# Patient Record
Sex: MALE | Race: WHITE | Employment: OTHER | URBAN - METROPOLITAN AREA
[De-identification: names, ages, dates, MRNs, and addresses within clinical notes are randomized per-mention and may not be internally consistent; named-entity substitution may affect disease eponyms.]

---

## 2024-09-04 ENCOUNTER — APPOINTMENT (OUTPATIENT)
Dept: CT IMAGING | Age: 44
End: 2024-09-04

## 2024-09-04 ENCOUNTER — HOSPITAL ENCOUNTER (INPATIENT)
Age: 44
LOS: 2 days | Discharge: HOME | End: 2024-09-06
Admitting: INTERNAL MEDICINE

## 2024-09-04 DIAGNOSIS — K57.20 DIVERTICULITIS OF COLON WITH PERFORATION: Primary | ICD-10-CM

## 2024-09-04 LAB
ALBUMIN SERPL-MCNC: 4.1 G/DL (ref 3.5–5.2)
ALP SERPL-CCNC: 64 U/L (ref 40–129)
ALT SERPL-CCNC: 12 U/L (ref 5–41)
AMORPH SED URNS QL MICRO: ABNORMAL
ANION GAP SERPL CALCULATED.3IONS-SCNC: 9 MMOL/L (ref 9–17)
AST SERPL-CCNC: 19 U/L
BASOPHILS # BLD: 0.04 K/UL (ref 0–0.2)
BASOPHILS NFR BLD: 0 % (ref 0–2)
BILIRUB SERPL-MCNC: 1 MG/DL (ref 0.3–1.2)
BILIRUB UR QL STRIP: ABNORMAL
BUN SERPL-MCNC: 16 MG/DL (ref 6–20)
BUN/CREAT SERPL: 20 (ref 9–20)
CALCIUM SERPL-MCNC: 9.2 MG/DL (ref 8.6–10.4)
CHLORIDE SERPL-SCNC: 100 MMOL/L (ref 98–107)
CLARITY UR: ABNORMAL
CO2 SERPL-SCNC: 26 MMOL/L (ref 20–31)
COLOR UR: YELLOW
CREAT SERPL-MCNC: 0.8 MG/DL (ref 0.7–1.2)
EOSINOPHIL # BLD: <0.03 K/UL (ref 0–0.44)
EOSINOPHILS RELATIVE PERCENT: 0 % (ref 1–4)
EPI CELLS #/AREA URNS HPF: ABNORMAL /HPF (ref 0–5)
ERYTHROCYTE [DISTWIDTH] IN BLOOD BY AUTOMATED COUNT: 12.2 % (ref 11.8–14.4)
GFR, ESTIMATED: >90 ML/MIN/1.73M2
GLUCOSE SERPL-MCNC: 121 MG/DL (ref 70–99)
GLUCOSE UR STRIP-MCNC: NEGATIVE MG/DL
HCT VFR BLD AUTO: 47.3 % (ref 40.7–50.3)
HGB BLD-MCNC: 15.9 G/DL (ref 13–17)
HGB UR QL STRIP.AUTO: ABNORMAL
IMM GRANULOCYTES # BLD AUTO: 0.06 K/UL (ref 0–0.3)
IMM GRANULOCYTES NFR BLD: 0 %
KETONES UR STRIP-MCNC: ABNORMAL MG/DL
LEUKOCYTE ESTERASE UR QL STRIP: NEGATIVE
LIPASE SERPL-CCNC: 18 U/L (ref 13–60)
LYMPHOCYTES NFR BLD: 1.1 K/UL (ref 1.1–3.7)
LYMPHOCYTES RELATIVE PERCENT: 8 % (ref 24–43)
MCH RBC QN AUTO: 30.1 PG (ref 25.2–33.5)
MCHC RBC AUTO-ENTMCNC: 33.6 G/DL (ref 28.4–34.8)
MCV RBC AUTO: 89.4 FL (ref 82.6–102.9)
MONOCYTES NFR BLD: 0.93 K/UL (ref 0.1–1.2)
MONOCYTES NFR BLD: 7 % (ref 3–12)
MUCOUS THREADS URNS QL MICRO: ABNORMAL
NEUTROPHILS NFR BLD: 85 % (ref 36–65)
NEUTS SEG NFR BLD: 11.2 K/UL (ref 1.5–8.1)
NITRITE UR QL STRIP: NEGATIVE
NRBC BLD-RTO: 0 PER 100 WBC
PH UR STRIP: 6.5 [PH] (ref 5–8)
PLATELET # BLD AUTO: 228 K/UL (ref 138–453)
PMV BLD AUTO: 10.1 FL (ref 8.1–13.5)
POTASSIUM SERPL-SCNC: 3.8 MMOL/L (ref 3.7–5.3)
PROT SERPL-MCNC: 7.7 G/DL (ref 6.4–8.3)
PROT UR STRIP-MCNC: ABNORMAL MG/DL
RBC # BLD AUTO: 5.29 M/UL (ref 4.21–5.77)
RBC #/AREA URNS HPF: ABNORMAL /HPF (ref 0–2)
SODIUM SERPL-SCNC: 135 MMOL/L (ref 135–144)
SP GR UR STRIP: 1.02 (ref 1–1.03)
UROBILINOGEN UR STRIP-ACNC: NORMAL EU/DL (ref 0–1)
WBC #/AREA URNS HPF: ABNORMAL /HPF (ref 0–5)
WBC OTHER # BLD: 13.4 K/UL (ref 3.5–11.3)

## 2024-09-04 PROCEDURE — 99221 1ST HOSP IP/OBS SF/LOW 40: CPT | Performed by: INTERNAL MEDICINE

## 2024-09-04 PROCEDURE — 6360000002 HC RX W HCPCS

## 2024-09-04 PROCEDURE — 81001 URINALYSIS AUTO W/SCOPE: CPT

## 2024-09-04 PROCEDURE — 1200000000 HC SEMI PRIVATE

## 2024-09-04 PROCEDURE — 2580000003 HC RX 258

## 2024-09-04 PROCEDURE — 6360000002 HC RX W HCPCS: Performed by: NURSE PRACTITIONER

## 2024-09-04 PROCEDURE — 80053 COMPREHEN METABOLIC PANEL: CPT

## 2024-09-04 PROCEDURE — 6360000004 HC RX CONTRAST MEDICATION

## 2024-09-04 PROCEDURE — 74177 CT ABD & PELVIS W/CONTRAST: CPT

## 2024-09-04 PROCEDURE — 99285 EMERGENCY DEPT VISIT HI MDM: CPT

## 2024-09-04 PROCEDURE — 85025 COMPLETE CBC W/AUTO DIFF WBC: CPT

## 2024-09-04 PROCEDURE — 83690 ASSAY OF LIPASE: CPT

## 2024-09-04 PROCEDURE — 96374 THER/PROPH/DIAG INJ IV PUSH: CPT

## 2024-09-04 PROCEDURE — 2580000003 HC RX 258: Performed by: NURSE PRACTITIONER

## 2024-09-04 RX ORDER — SODIUM CHLORIDE 0.9 % (FLUSH) 0.9 %
10 SYRINGE (ML) INJECTION PRN
Status: DISCONTINUED | OUTPATIENT
Start: 2024-09-04 | End: 2024-09-06 | Stop reason: HOSPADM

## 2024-09-04 RX ORDER — ACETAMINOPHEN 325 MG/1
650 TABLET ORAL ONCE
Status: DISCONTINUED | OUTPATIENT
Start: 2024-09-04 | End: 2024-09-06 | Stop reason: HOSPADM

## 2024-09-04 RX ORDER — SODIUM CHLORIDE 9 MG/ML
INJECTION, SOLUTION INTRAVENOUS PRN
Status: DISCONTINUED | OUTPATIENT
Start: 2024-09-04 | End: 2024-09-06 | Stop reason: HOSPADM

## 2024-09-04 RX ORDER — SODIUM CHLORIDE 9 MG/ML
INJECTION, SOLUTION INTRAVENOUS CONTINUOUS
Status: DISCONTINUED | OUTPATIENT
Start: 2024-09-04 | End: 2024-09-06

## 2024-09-04 RX ORDER — METRONIDAZOLE 500 MG/100ML
500 INJECTION, SOLUTION INTRAVENOUS EVERY 8 HOURS
Status: DISCONTINUED | OUTPATIENT
Start: 2024-09-04 | End: 2024-09-06 | Stop reason: HOSPADM

## 2024-09-04 RX ORDER — 0.9 % SODIUM CHLORIDE 0.9 %
100 INTRAVENOUS SOLUTION INTRAVENOUS ONCE
Status: COMPLETED | OUTPATIENT
Start: 2024-09-04 | End: 2024-09-04

## 2024-09-04 RX ORDER — ONDANSETRON 4 MG/1
4 TABLET, ORALLY DISINTEGRATING ORAL EVERY 8 HOURS PRN
Status: DISCONTINUED | OUTPATIENT
Start: 2024-09-04 | End: 2024-09-06 | Stop reason: HOSPADM

## 2024-09-04 RX ORDER — IOPAMIDOL 755 MG/ML
75 INJECTION, SOLUTION INTRAVASCULAR
Status: COMPLETED | OUTPATIENT
Start: 2024-09-04 | End: 2024-09-04

## 2024-09-04 RX ORDER — ACETAMINOPHEN 325 MG/1
650 TABLET ORAL EVERY 6 HOURS PRN
Status: DISCONTINUED | OUTPATIENT
Start: 2024-09-04 | End: 2024-09-06 | Stop reason: HOSPADM

## 2024-09-04 RX ORDER — POLYETHYLENE GLYCOL 3350 17 G/17G
17 POWDER, FOR SOLUTION ORAL DAILY PRN
Status: DISCONTINUED | OUTPATIENT
Start: 2024-09-04 | End: 2024-09-06 | Stop reason: HOSPADM

## 2024-09-04 RX ORDER — MAGNESIUM SULFATE 1 G/100ML
1000 INJECTION INTRAVENOUS PRN
Status: DISCONTINUED | OUTPATIENT
Start: 2024-09-04 | End: 2024-09-06 | Stop reason: HOSPADM

## 2024-09-04 RX ORDER — POTASSIUM CHLORIDE 7.45 MG/ML
10 INJECTION INTRAVENOUS PRN
Status: DISCONTINUED | OUTPATIENT
Start: 2024-09-04 | End: 2024-09-06 | Stop reason: HOSPADM

## 2024-09-04 RX ORDER — POTASSIUM CHLORIDE 1500 MG/1
40 TABLET, EXTENDED RELEASE ORAL PRN
Status: DISCONTINUED | OUTPATIENT
Start: 2024-09-04 | End: 2024-09-06 | Stop reason: HOSPADM

## 2024-09-04 RX ORDER — SODIUM CHLORIDE 0.9 % (FLUSH) 0.9 %
5-40 SYRINGE (ML) INJECTION EVERY 12 HOURS SCHEDULED
Status: DISCONTINUED | OUTPATIENT
Start: 2024-09-04 | End: 2024-09-06 | Stop reason: HOSPADM

## 2024-09-04 RX ORDER — ACETAMINOPHEN 650 MG/1
650 SUPPOSITORY RECTAL EVERY 6 HOURS PRN
Status: DISCONTINUED | OUTPATIENT
Start: 2024-09-04 | End: 2024-09-06 | Stop reason: HOSPADM

## 2024-09-04 RX ORDER — ONDANSETRON 2 MG/ML
4 INJECTION INTRAMUSCULAR; INTRAVENOUS EVERY 6 HOURS PRN
Status: DISCONTINUED | OUTPATIENT
Start: 2024-09-04 | End: 2024-09-06 | Stop reason: HOSPADM

## 2024-09-04 RX ORDER — 0.9 % SODIUM CHLORIDE 0.9 %
1000 INTRAVENOUS SOLUTION INTRAVENOUS ONCE
Status: COMPLETED | OUTPATIENT
Start: 2024-09-04 | End: 2024-09-04

## 2024-09-04 RX ORDER — METRONIDAZOLE 500 MG/100ML
500 INJECTION, SOLUTION INTRAVENOUS ONCE
Status: COMPLETED | OUTPATIENT
Start: 2024-09-04 | End: 2024-09-04

## 2024-09-04 RX ADMIN — SODIUM CHLORIDE 1000 ML: 9 INJECTION, SOLUTION INTRAVENOUS at 13:01

## 2024-09-04 RX ADMIN — SODIUM CHLORIDE, PRESERVATIVE FREE 10 ML: 5 INJECTION INTRAVENOUS at 13:24

## 2024-09-04 RX ADMIN — SODIUM CHLORIDE 100 ML: 9 INJECTION, SOLUTION INTRAVENOUS at 13:24

## 2024-09-04 RX ADMIN — METRONIDAZOLE 500 MG: 500 INJECTION, SOLUTION INTRAVENOUS at 21:37

## 2024-09-04 RX ADMIN — SODIUM CHLORIDE: 9 INJECTION, SOLUTION INTRAVENOUS at 17:07

## 2024-09-04 RX ADMIN — WATER 1000 MG: 1 INJECTION INTRAMUSCULAR; INTRAVENOUS; SUBCUTANEOUS at 14:59

## 2024-09-04 RX ADMIN — IOPAMIDOL 75 ML: 755 INJECTION, SOLUTION INTRAVENOUS at 13:24

## 2024-09-04 RX ADMIN — METRONIDAZOLE 500 MG: 500 INJECTION, SOLUTION INTRAVENOUS at 15:09

## 2024-09-04 ASSESSMENT — ENCOUNTER SYMPTOMS
VOMITING: 0
CHEST TIGHTNESS: 0
DIARRHEA: 1
ABDOMINAL PAIN: 1
BACK PAIN: 0
NAUSEA: 0
SHORTNESS OF BREATH: 0
WHEEZING: 0
COLOR CHANGE: 0

## 2024-09-04 NOTE — H&P
[facility]  Office: [phone]  John Dunn DO, Tomasz Huynh DO, Eddy Chan DO, Shashi Adams DO, Mervat Stockton MD, Kenya Mitchell MD, Evita Feliz MD, Alba Min MD,  Al Becerra MD, Dc Otero MD, John Paul Villalpando MD,  Priya Jay DO, Victoria Vegas MD, Tha Alexander MD, Jason Dunn DO, Stephanie Rogers MD,  Jose Galindo DO, Shoshana Sauer MD, Hazel Guardado MD, Leticia Vogt MD, Melina Michaud MD,  Darrin Lockwood MD, Sahara Nunes MD, Lilli Hernandez MD, Gypsy Reeves MD, Siddhartha Heart MD, Tiffany Jaime MD, Jayden Griffin DO, Nathan Avila DO, Primo Sorenson DO, Lazara Hernandez MD,  Lauro Haynes MD, Shirley Waterhouse, CNP,  Kathy Haines, CNP, Jayden Merino, CNP,  Annel Luna, PATTI, Alexandria Waldron, CNP, Luci Mullen, CNP, Shari Ferrer, CNP, Darlene Arnold, CNP, Sherrie Cox, PA-C, Candie Santillan, PA-C, Divya Bella, CNP, Erlin Roy, CNP,  Beba Reza, CNP, Ena Weldon, CNP, Mary Roth, CNP, Bhavna Preston, CNS, Dayanna Olson, CNP, Lilia Blanco CNP, Tracy Schwab, CNP         [facility]   IN-PATIENT SERVICE   [facility]    HISTORY AND PHYSICAL EXAMINATION            Date:   9/4/2024  Patient name:  Cecil Bauer  Date of admission:  9/4/2024 12:10 PM  MRN:   [id number]  Account:  [id number]  YOB: 1980  PCP:    No primary care provider on file.  Room:   [place] [address]  Code Status:    No Order    Chief Complaint:     Chief Complaint   Patient presents with    Nausea    Diarrhea     X 2 days. Pt states they had ecoli in water    Abdominal Pain     Hernia on the right side        History Obtained From:     patient, spouse, electronic medical record    History of Present Illness:     Cecil Bauer is a 44 y.o. Non- / non  male who presents with Nausea, Diarrhea (X 2 days. Pt states they had ecoli in water), and Abdominal Pain (Hernia on the right side )   and is admitted to the  MCHC 33.6 28.4 - 34.8 g/dL    RDW 12.2 11.8 - 14.4 %    Platelets 228 138 - 453 k/uL    MPV 10.1 8.1 - 13.5 fL    NRBC Automated 0.0 0.0 per 100 WBC    Neutrophils % 85 (H) 36 - 65 %    Lymphocytes % 8 (L) 24 - 43 %    Monocytes % 7 3 - 12 %    Eosinophils % 0 (L) 1 - 4 %    Basophils % 0 0 - 2 %    Immature Granulocytes % 0 0 %    Neutrophils Absolute 11.20 (H) 1.50 - 8.10 k/uL    Lymphocytes Absolute 1.10 1.10 - 3.70 k/uL    Monocytes Absolute 0.93 0.10 - 1.20 k/uL    Eosinophils Absolute <0.03 0.00 - 0.44 k/uL    Basophils Absolute 0.04 0.00 - 0.20 k/uL    Immature Granulocytes Absolute 0.06 0.00 - 0.30 k/uL   CMP    Collection Time: 09/04/24 12:54 PM   Result Value Ref Range    Sodium 135 135 - 144 mmol/L    Potassium 3.8 3.7 - 5.3 mmol/L    Chloride 100 98 - 107 mmol/L    CO2 26 20 - 31 mmol/L    Anion Gap 9 9 - 17 mmol/L    Glucose 121 (H) 70 - 99 mg/dL    BUN 16 6 - 20 mg/dL    Creatinine 0.8 0.7 - 1.2 mg/dL    Est, Glom Filt Rate >90 >60 mL/min/1.73m2    BUN/Creatinine Ratio 20 9 - 20    Calcium 9.2 8.6 - 10.4 mg/dL    Total Protein 7.7 6.4 - 8.3 g/dL    Albumin 4.1 3.5 - 5.2 g/dL    Total Bilirubin 1.0 0.3 - 1.2 mg/dL    Alkaline Phosphatase 64 40 - 129 U/L    ALT 12 5 - 41 U/L    AST 19 <40 U/L   Lipase    Collection Time: 09/04/24 12:54 PM   Result Value Ref Range    Lipase 18 13 - 60 U/L   Urinalysis with Reflex to Culture    Collection Time: 09/04/24  1:35 PM    Specimen: Urine   Result Value Ref Range    Color, UA Yellow Yellow    Turbidity UA SLIGHTLY CLOUDY (A) Clear    Glucose, Ur NEGATIVE NEGATIVE mg/dL    Bilirubin, Urine NEGATIVE  Verified by ictotest. (A) NEGATIVE    Ketones, Urine 3+ (A) NEGATIVE mg/dL    Specific Gravity, UA 1.025 1.005 - 1.030    Urine Hgb TRACE (A) NEGATIVE    pH, Urine 6.5 5.0 - 8.0    Protein, UA TRACE (A) NEGATIVE mg/dL    Urobilinogen, Urine Normal 0.0 - 1.0 EU/dL    Nitrite, Urine NEGATIVE NEGATIVE    Leukocyte Esterase, Urine NEGATIVE NEGATIVE   Microscopic Urinalysis

## 2024-09-04 NOTE — ED PROVIDER NOTES
[facility] ED  Emergency Department Encounter  Emergency Medicine Attending     Pt Name:Cecil Bauer  MRN: [id number]  Birthdate 1980  Date of evaluation: 9/4/24  PCP:  No primary care provider on file.  Note Started: 12:48 PM EDT      CHIEF COMPLAINT       Chief Complaint   Patient presents with    Nausea    Diarrhea     X 2 days. Pt states they had ecoli in water    Abdominal Pain     Hernia on the right side        HISTORY OF PRESENT ILLNESS  (Location/Symptom, Timing/Onset, Context/Setting, Quality, Duration, Modifying Factors, Severity.)      44-year-old male presents for evaluation of lower abdominal pain.  His symptoms started earlier this morning around 1 AM when he was peeing.  He states that he felt burning while peeing and became lightheaded.  He also felt abdominal pain later in the morning.  He has been experiencing low and left abdominal pain all day today.  He has been passing stool with diarrhea around it.  No nausea or vomiting.  His abdominal pain has continued.  No history of abdominal surgeries.  No chest pain or shortness of breath.            PAST MEDICAL / SURGICAL / SOCIAL / FAMILY HISTORY      has no past medical history on file.     has no past surgical history on file.    Social History     Socioeconomic History    Marital status:      Spouse name: Not on file    Number of children: Not on file    Years of education: Not on file    Highest education level: Not on file   Occupational History    Not on file   Tobacco Use    Smoking status: Never    Smokeless tobacco: Never   Substance and Sexual Activity    Alcohol use: Yes    Drug use: Never    Sexual activity: Not on file   Other Topics Concern    Not on file   Social History Narrative    Not on file     Social Determinants of Health     Financial Resource Strain: Not on file   Food Insecurity: Not on file   Transportation Needs: Not on file   Physical Activity: Not on file   Stress: Not on file   Social Connections:

## 2024-09-04 NOTE — PROGRESS NOTES
Admitted from ER  to room 1022 .   Pt alert and oriented.   Pt oriented to call light system and agreeable to call for assistance.    Family at bed side  Vital signs recorded    Telemetry monitor applied.   Admission documentation completed  Home Meds

## 2024-09-04 NOTE — CONSULTS
melena  : Denies polyuria, dysuria, hematuria  Endo: Denies any history of diabetes  Heme: Denies anemia, h/o bleeding or clotting problems.   Neuro: .  Denies h/o CVA, TIA  Skin: Denies rashes, ulcers  Musculoskeletal: Denies muscle, joint, back pain.    Allergies: Patient has no known allergies.    Current Meds:  Current Facility-Administered Medications:     acetaminophen (TYLENOL) tablet 650 mg, 650 mg, Oral, Once, Swade, Michael N, DO    sodium chloride flush 0.9 % injection 10 mL, 10 mL, IntraVENous, PRN, Swade, Michael N, DO, 10 mL at 09/04/24 1324    metroNIDAZOLE (FLAGYL) 500 mg in 0.9% NaCl 100 mL IVPB premix, 500 mg, IntraVENous, Once, Swade, Michael N, DO, Last Rate: 100 mL/hr at 09/04/24 1509, 500 mg at 09/04/24 1509  No current outpatient medications on file.    Vital Signs:  Vitals:    09/04/24 1155   BP: (!) 126/93   Pulse: 80   Resp: 16   Temp: 98.2 °F (36.8 °C)   SpO2: 98%       Physical Exam:  Vital signs and Nurse's note reviewed  Gen:  A&Ox3, NAD  HEENT: NCAT, PERRLA, EOMI, no scleral icterus, oral mucosa moist  Neck: Supple, no thyroid enlargement, no cervical or supraclavicular lymphaedenopathy  Chest: Symmetric rise with inhalation, no evidence of trauma  CVS: Regular rate and rhythm, no murmurs, no rubs or gallops  Resp: Good bilateral air entry, clear to auscultation b/l, no wheeze or rhonchi  Abd: soft, non-tender, non-distended, no hepatosplenomegaly or palpable masses, bowel sounds present.  Patient is tender to palpation in the left lower quadrant and suprapubic region.  There is no rebound but mild guarding.  Ext: No clubbing, cyanosis, edema, peripheral pulses   CNS: Moves all extremities, no gross focal motor deficits  Skin: No erythema or ulcerations     Labs:   CBC:   Recent Labs     09/04/24  1254   WBC 13.4*   HGB 15.9        BMP:    Recent Labs     09/04/24  1254      K 3.8      CO2 26   BUN 16   CREATININE 0.8   GLUCOSE 121*     Hepatic:   Recent Labs      09/04/24  1254   AST 19   ALT 12   ALKPHOS 64   BILITOT 1.0   LIPASE 18     Coagulation: No results for input(s): \"APTT\", \"INR\" in the last 72 hours.    Invalid input(s): \"PROT\"      CXR:  US Liver/biliary:  CT Abd/pelvis:         Problem List:  Patient Active Problem List    Diagnosis Date Noted    Diverticulitis of colon with perforation 09/04/2024       Impression:    Cecil Bauer is a 44 y.o. male with with new onset of abdominal pain.  He is noted to have mild leukocytosis.  CAT scan shows findings of sigmoid diverticulitis with localized perforation.    Recommendation:    Recommend bowel rest, IV fluids and IV antibiotics.  If he continues to improve, consideration for slowly starting him on liquid diet and then advancing his diet will be given.  If on the other hand patient's symptoms worsen, he may require emergent resection with a colostomy  If he continues to improve, I would recommend undertaking a colonoscopy in about 6 to 8 weeks to evaluate the colon.  We will make further recommendations regarding his findings of hernia  Patient's questions were answered.  We will follow the patient with you.      Electronically signed by Spike Rios MD  on 9/4/2024 at 3:44 PM   [phone]

## 2024-09-04 NOTE — PROGRESS NOTES
[x] Medication Reconciliation was completed and the patient's home medication list was verified. The Med List Status is \"Complete\". The following sources were used to assist with Medication Reconciliation:    [] Med Rec Pharmacist already completed  [] Patient had a list of medications which was transcribed into the EHR.  [] Patient provided bottles of their medications  [x] Home medications reviewed and confirmed with patient  [] Contacted patient's pharmacy to confirm home medications  [] Contacted patient's physician office to confirm home medications  [] Medical Records from another facility and/or Care Everywhere were reviewed  [] MAR from facility requested to be faxed over  [] Unable to complete due to patient condition  [] Unable to validate med reconciliation      [] There are one or more home medications that need clarification before Medication Reconciliation can be completed. The Med List Status has been marked as In Progress.     To assist with Home Medication Reconciliation the following actions have been taken:    [] Pharmacy medication reconciliation service requested. (Note: This can be done by sending a Perfect Serve message to The Med Rec Pharmacist or by phoning [phone].)  [] Family requested to bring medications into the hospital  [] Family requested to call hospital with medication list  [] Message left with physician office  [] Request for medical records made to   [] Other

## 2024-09-04 NOTE — ED NOTES
ED TO INPATIENT SBAR HANDOFF    Patient Name: Cecil Bauer   :  1980  44 y.o.   MRN:  [id number]  Preferred Name    ED Room #:  STA25/25  Family/Caregiver Present yes   Restraints no   Sitter no   Sepsis Risk Score      Situation  Code Status: No Order No additional code details.    Allergies: Patient has no known allergies.  Weight: Patient Vitals for the past 96 hrs (Last 3 readings):   Weight   24 1155 88.9 kg (196 lb)     Arrived from: home  Chief Complaint:   Chief Complaint   Patient presents with    Nausea    Diarrhea     X 2 days. Pt states they had ecoli in water    Abdominal Pain     Hernia on the right side      Hospital Problem/Diagnosis:  Principal Problem:    Diverticulitis of colon with perforation  Resolved Problems:    * No resolved hospital problems. *    Imaging:   CT ABDOMEN PELVIS W IV CONTRAST Additional Contrast? None   Final Result   1. Findings consistent with acute diverticulitis.  A tiny foci of pericolonic   extraluminal gas which would indicate a contained perforation.  No abscess   formation.   2. Simple hepatic and renal cysts require no follow-up.           Abnormal labs:   Abnormal Labs Reviewed   CBC WITH AUTO DIFFERENTIAL - Abnormal; Notable for the following components:       Result Value    WBC 13.4 (*)     Neutrophils % 85 (*)     Lymphocytes % 8 (*)     Eosinophils % 0 (*)     Neutrophils Absolute 11.20 (*)     All other components within normal limits   COMPREHENSIVE METABOLIC PANEL - Abnormal; Notable for the following components:    Glucose 121 (*)     All other components within normal limits   URINALYSIS WITH REFLEX TO CULTURE - Abnormal; Notable for the following components:    Turbidity UA SLIGHTLY CLOUDY (*)     Bilirubin, Urine NEGATIVE  Verified by ictotest. (*)     Ketones, Urine 3+ (*)     Urine Hgb TRACE (*)     Protein, UA TRACE (*)     All other components within normal limits   MICROSCOPIC URINALYSIS - Abnormal; Notable for the following  components:    Mucus, UA 1+ (*)     Amorphous, UA 1+ (*)     All other components within normal limits     Critical values: no     Abnormal Assessment Findings: Diverticulitis of colon with perforation.    Background  History: History reviewed. No pertinent past medical history.    Medication Review:  [] Via patient  [] From medication list  [] Pharmacy Med Rec  [] Unable to assess based on patient condition  [] Rn not able to complete      Assessment    Vitals/MEWS: MEWS Score: 1  Level of Consciousness: Alert (0)   Vitals:    09/04/24 1155   BP: (!) 126/93   Pulse: 80   Resp: 16   Temp: 98.2 °F (36.8 °C)   TempSrc: Oral   SpO2: 98%   Weight: 88.9 kg (196 lb)   Height: 1.778 m (5' 10\")     FiO2 (%):   O2 Flow Rate:      Cardiac Rhythm:    Pain Assessment:  [] Verbal [] Becerra Baker Scale  Pain Scale:    Last documented pain score (0-10 scale)    Last documented pain medication administered:   Mental Status: oriented  Orientation Level:    NIH Score:    C-SSRS: Risk of Suicide: No Risk  Bedside swallow:    Pep Coma Scale (GCS): Millicent Coma Scale  Eye Opening: Spontaneous  Best Verbal Response: Oriented  Best Motor Response: Obeys commands  Millicent Coma Scale Score: 15  Active LDA's:   Peripheral IV 09/04/24 Right Antecubital (Active)   Site Assessment Clean, dry & intact 09/04/24 1255   Phlebitis Assessment No symptoms 09/04/24 1255   Infiltration Assessment 0 09/04/24 1255   Alcohol Cap Used No 09/04/24 1255   Dressing Status New dressing applied;Clean, dry & intact 09/04/24 1255   Dressing Type Transparent 09/04/24 1255                 PO Status:  NPO  Pertinent or High Risk Medications/Drips: no   If Yes, please provide details:   Pending Blood Product Administration: no       You may also review the ED PT Care Timeline found under the Summary Nursing Index tab.    Recommendation      Pending orders   Plan for Discharge (if known):   Additional Comments:    If any further questions, please call Sending RN at

## 2024-09-05 LAB
ALBUMIN SERPL-MCNC: 3.6 G/DL (ref 3.5–5.2)
ALP SERPL-CCNC: 59 U/L (ref 40–129)
ALT SERPL-CCNC: 14 U/L (ref 5–41)
ANION GAP SERPL CALCULATED.3IONS-SCNC: 13 MMOL/L (ref 9–17)
AST SERPL-CCNC: 15 U/L
BASOPHILS # BLD: 0.04 K/UL (ref 0–0.2)
BASOPHILS NFR BLD: 0 % (ref 0–2)
BILIRUB SERPL-MCNC: 1 MG/DL (ref 0.3–1.2)
BUN SERPL-MCNC: 11 MG/DL (ref 6–20)
BUN/CREAT SERPL: 14 (ref 9–20)
CALCIUM SERPL-MCNC: 8.4 MG/DL (ref 8.6–10.4)
CHLORIDE SERPL-SCNC: 103 MMOL/L (ref 98–107)
CO2 SERPL-SCNC: 22 MMOL/L (ref 20–31)
CREAT SERPL-MCNC: 0.8 MG/DL (ref 0.7–1.2)
EOSINOPHIL # BLD: 0.04 K/UL (ref 0–0.44)
EOSINOPHILS RELATIVE PERCENT: 0 % (ref 1–4)
ERYTHROCYTE [DISTWIDTH] IN BLOOD BY AUTOMATED COUNT: 12.1 % (ref 11.8–14.4)
GFR, ESTIMATED: >90 ML/MIN/1.73M2
GLUCOSE SERPL-MCNC: 96 MG/DL (ref 70–99)
HCT VFR BLD AUTO: 44.2 % (ref 40.7–50.3)
HGB BLD-MCNC: 14.7 G/DL (ref 13–17)
IMM GRANULOCYTES # BLD AUTO: 0.07 K/UL (ref 0–0.3)
IMM GRANULOCYTES NFR BLD: 1 %
LYMPHOCYTES NFR BLD: 1.4 K/UL (ref 1.1–3.7)
LYMPHOCYTES RELATIVE PERCENT: 10 % (ref 24–43)
MAGNESIUM SERPL-MCNC: 1.9 MG/DL (ref 1.6–2.6)
MCH RBC QN AUTO: 30.2 PG (ref 25.2–33.5)
MCHC RBC AUTO-ENTMCNC: 33.3 G/DL (ref 28.4–34.8)
MCV RBC AUTO: 90.8 FL (ref 82.6–102.9)
MONOCYTES NFR BLD: 1.1 K/UL (ref 0.1–1.2)
MONOCYTES NFR BLD: 8 % (ref 3–12)
NEUTROPHILS NFR BLD: 81 % (ref 36–65)
NEUTS SEG NFR BLD: 11 K/UL (ref 1.5–8.1)
NRBC BLD-RTO: 0 PER 100 WBC
PLATELET # BLD AUTO: 217 K/UL (ref 138–453)
PMV BLD AUTO: 10.6 FL (ref 8.1–13.5)
POTASSIUM SERPL-SCNC: 3.7 MMOL/L (ref 3.7–5.3)
PROT SERPL-MCNC: 6.7 G/DL (ref 6.4–8.3)
RBC # BLD AUTO: 4.87 M/UL (ref 4.21–5.77)
SODIUM SERPL-SCNC: 138 MMOL/L (ref 135–144)
WBC OTHER # BLD: 13.7 K/UL (ref 3.5–11.3)

## 2024-09-05 PROCEDURE — 80053 COMPREHEN METABOLIC PANEL: CPT

## 2024-09-05 PROCEDURE — 6360000002 HC RX W HCPCS: Performed by: NURSE PRACTITIONER

## 2024-09-05 PROCEDURE — 2580000003 HC RX 258: Performed by: INTERNAL MEDICINE

## 2024-09-05 PROCEDURE — 36415 COLL VENOUS BLD VENIPUNCTURE: CPT

## 2024-09-05 PROCEDURE — 83735 ASSAY OF MAGNESIUM: CPT

## 2024-09-05 PROCEDURE — 2580000003 HC RX 258: Performed by: NURSE PRACTITIONER

## 2024-09-05 PROCEDURE — 6370000000 HC RX 637 (ALT 250 FOR IP): Performed by: NURSE PRACTITIONER

## 2024-09-05 PROCEDURE — 85025 COMPLETE CBC W/AUTO DIFF WBC: CPT

## 2024-09-05 PROCEDURE — 99232 SBSQ HOSP IP/OBS MODERATE 35: CPT | Performed by: INTERNAL MEDICINE

## 2024-09-05 PROCEDURE — 6360000002 HC RX W HCPCS: Performed by: INTERNAL MEDICINE

## 2024-09-05 PROCEDURE — 1200000000 HC SEMI PRIVATE

## 2024-09-05 RX ORDER — IBUPROFEN 200 MG
200 TABLET ORAL EVERY 6 HOURS PRN
COMMUNITY

## 2024-09-05 RX ADMIN — SODIUM CHLORIDE: 9 INJECTION, SOLUTION INTRAVENOUS at 21:56

## 2024-09-05 RX ADMIN — WATER 1000 MG: 1 INJECTION INTRAMUSCULAR; INTRAVENOUS; SUBCUTANEOUS at 14:53

## 2024-09-05 RX ADMIN — ACETAMINOPHEN 650 MG: 325 TABLET ORAL at 08:42

## 2024-09-05 RX ADMIN — METRONIDAZOLE 500 MG: 500 INJECTION, SOLUTION INTRAVENOUS at 05:47

## 2024-09-05 RX ADMIN — METRONIDAZOLE 500 MG: 500 INJECTION, SOLUTION INTRAVENOUS at 22:03

## 2024-09-05 RX ADMIN — METRONIDAZOLE 500 MG: 500 INJECTION, SOLUTION INTRAVENOUS at 15:06

## 2024-09-05 RX ADMIN — SODIUM CHLORIDE: 9 INJECTION, SOLUTION INTRAVENOUS at 10:13

## 2024-09-05 RX ADMIN — SODIUM CHLORIDE: 9 INJECTION, SOLUTION INTRAVENOUS at 01:16

## 2024-09-05 ASSESSMENT — PAIN DESCRIPTION - PAIN TYPE: TYPE: ACUTE PAIN

## 2024-09-05 ASSESSMENT — PAIN DESCRIPTION - LOCATION: LOCATION: HEAD

## 2024-09-05 ASSESSMENT — PAIN SCALES - GENERAL
PAINLEVEL_OUTOF10: 5
PAINLEVEL_OUTOF10: 2

## 2024-09-05 ASSESSMENT — PAIN - FUNCTIONAL ASSESSMENT: PAIN_FUNCTIONAL_ASSESSMENT: ACTIVITIES ARE NOT PREVENTED

## 2024-09-05 ASSESSMENT — PAIN DESCRIPTION - DESCRIPTORS: DESCRIPTORS: ACHING

## 2024-09-05 ASSESSMENT — PAIN DESCRIPTION - FREQUENCY: FREQUENCY: CONTINUOUS

## 2024-09-05 ASSESSMENT — PAIN DESCRIPTION - ORIENTATION: ORIENTATION: LEFT

## 2024-09-05 ASSESSMENT — PAIN DESCRIPTION - ONSET: ONSET: GRADUAL

## 2024-09-05 NOTE — PROGRESS NOTES
General Surgery:  Daily Progress Note  Dr. Rios, Dr. Hay, Dr. Akhtar          PATIENT NAME: Cecil Bauer     TODAY'S DATE: 9/5/2024, 6:10 AM  CC: Abdominal pain    SUBJECTIVE:     Pt seen and examined at bedside.  No acute events overnight.  Patient's abdominal pain has improved since yesterday.  He had 2 mucousy bowel movements overnight.  Leukocytosis remains elevated.    OBJECTIVE:   VITALS:  /69   Pulse 86   Temp 99 °F (37.2 °C) (Oral)   Resp 16   Ht 1.778 m (5' 10\")   Wt 90.7 kg (199 lb 14.4 oz)   SpO2 97%   BMI 28.68 kg/m²      INTAKE/OUTPUT:      Intake/Output Summary (Last 24 hours) at 9/5/2024 0610  Last data filed at 9/5/2024 0548  Gross per 24 hour   Intake 2593.46 ml   Output --   Net 2593.46 ml       PHYSICAL EXAM:  General Appearance: Awake, alert, no distress  HEENT:  Normocephalic, atraumatic, mucus membranes moist   Heart: Regular rate and rhythm  Lungs: Normal effort with respirations  Abdomen: Soft, nondistended, minimally tender in the suprapubic area.  No guarding   Extremities: No cyanosis, pitting edema, rashes noted.    Skin: Skin color, texture, turgor normal. No rashes or lesions.    IV Access:  PIV      Data:  CBC with Differential:    Lab Results   Component Value Date/Time    WBC 13.7 09/05/2024 04:58 AM    RBC 4.87 09/05/2024 04:58 AM    HGB 14.7 09/05/2024 04:58 AM    HCT 44.2 09/05/2024 04:58 AM     09/05/2024 04:58 AM    MCV 90.8 09/05/2024 04:58 AM    MCH 30.2 09/05/2024 04:58 AM    MCHC 33.3 09/05/2024 04:58 AM    RDW 12.1 09/05/2024 04:58 AM    LYMPHOPCT 10 09/05/2024 04:58 AM    MONOPCT 8 09/05/2024 04:58 AM    EOSPCT 0 09/05/2024 04:58 AM    BASOPCT 0 09/05/2024 04:58 AM    MONOSABS 1.10 09/05/2024 04:58 AM    LYMPHSABS 1.40 09/05/2024 04:58 AM    EOSABS 0.04 09/05/2024 04:58 AM    BASOSABS 0.04 09/05/2024 04:58 AM     BMP:    Lab Results   Component Value Date/Time     09/05/2024 04:58 AM    K 3.7 09/05/2024 04:58 AM     09/05/2024 04:58 AM     bony abnormality.     1. Findings consistent with acute diverticulitis.  A tiny foci of pericolonic extraluminal gas which would indicate a contained perforation.  No abscess formation. 2. Simple hepatic and renal cysts require no follow-up.        ASSESSMENT:  Active Hospital Problems    Diagnosis Date Noted    Diverticulitis of colon with perforation [K57.20] 09/04/2024       44 y.o. male with microperforation diverticulitis.    Plan:  Diet: Advance to clinical diet  Continue IV antibiotics  Encourage ambulation  No acute surgical intervention at this time.  We will continue to treat with IV antibiotics.    Okay for DVT prophylaxis  Plan for outpatient follow-up on bilateral inguinal hernias.    Electronically signed by Antonia Cantor MD  on 9/5/2024 at 6:10 AM   Attending Physician Statement  I have discussed the case, including pertinent history and exam findings with the resident. I agree with the assessment, plan and orders as documented by the resident.    Improving  Start clear liquids

## 2024-09-05 NOTE — PROGRESS NOTES
[facility]  Office: [phone]  John Dunn DO, Tomasz Huynh, DO, Eddy Chan DO, Shashi Adams, DO, Mervat Stockton MD, Kenya Mitchell MD, Evita Feliz MD, Alba Min MD,  Al Becerra MD, Dc Otero MD, John Paul Villalpando MD,  Priya Jay DO, Victoria Vegas MD, Tha Alexander MD, Jason Dunn DO, Stephanie Rogers MD,  Jose Galindo DO, Shoshana Sauer MD, Hazel Guardado MD, Leticia Vogt MD, Melina Michaud MD,  Darrin Lockwood MD, Sahara Nunes MD, Lilli Hernandez MD, Gypsy Reeves MD, Siddhartha Heart MD, Tiffany Jaime MD, Jayden Griffin, DO, Nathan Avila DO, Primo Sorenson DO, Lazara Hernandez MD,  Lauro Haynes MD, Shirley Waterhouse, CNP,  Kathy Haines, CNP, Jayden Merino, CNP,  Annel Luna, DNP, Alexandria Waldron, CNP, Luci Mullen, CNP, Shari Ferrer, CNP, Darlene Arnold, CNP, Sherrie Cox, PA-C, Candie Santillan, PA-C, Divya Bella, CNP, Erlin Roy, CNP,  Beba Reza, CNP, Ena Weldon, CNP, Mary Roth, CNP, Bhavna Preston, CNS, Dayanna Olson, CNP, Lilia Blanco CNP, Tracy Schwab, CNP         [facility]   IN-PATIENT SERVICE   [facility]    Progress Note    9/5/2024    8:32 AM    Name:   Cecil Bauer  MRN:     [id number]     Acct:      [id number]   Room:   1022/1022-01   Day:  1  Admit Date:  9/4/2024 12:10 PM    PCP:   No primary care provider on file.  Code Status:  Full Code    Subjective:     C/C:   Chief Complaint   Patient presents with    Nausea    Diarrhea     X 2 days. Pt states they had ecoli in water    Abdominal Pain     Hernia on the right side      Interval History Status: improved.     Feels better today  Denies cp/sob/n/v  Abd pain better    Brief History:     This 44 yom presents with abd pain that started day before admission. It has been constant, located lower center abdomen and felt like gas pains. He has also had nausea, some diarrhea and low grade fevers. It also hurt to urinate. He states for a  improve    Shashi SAN Blood, DO  9/5/2024  8:32 AM

## 2024-09-05 NOTE — CARE COORDINATION
Case Management Assessment  Initial Evaluation    Date/Time of Evaluation: 9/5/2024 4:07 PM  Assessment Completed by: VENU HLETON RN    If patient is discharged prior to next notation, then this note serves as note for discharge by case management.    Patient Name: Cecil Bauer                   YOB: 1980  Diagnosis: Diverticulitis of colon with perforation [K57.20]                   Date / Time: 9/4/2024 12:10 PM    Patient Admission Status: Inpatient   Readmission Risk (Low < 19, Mod (19-27), High > 27): Readmission Risk Score: 3.5    Current PCP: No primary care provider on file.  PCP verified by CM? No    Chart Reviewed: Yes      History Provided by: Patient  Patient Orientation: Alert and Oriented    Patient Cognition: Alert    Hospitalization in the last 30 days (Readmission):  No    If yes, Readmission Assessment in CM Navigator will be completed.    Advance Directives:      Code Status: Full Code   Patient's Primary Decision Maker is: Legal Next of Kin      Discharge Planning:    Patient lives with: Spouse/Significant Other Type of Home: House  Primary Care Giver: Self  Patient Support Systems include: Spouse/Significant Other   Current Financial resources: HELP, None  Current community resources: None  Current services prior to admission: None            Current DME:              Type of Home Care services:  None    ADLS  Prior functional level: Independent in ADLs/IADLs  Current functional level: Independent in ADLs/IADLs    PT AM-PAC:   /24  OT AM-PAC:   /24    Family can provide assistance at DC: Yes  Would you like Case Management to discuss the discharge plan with any other family members/significant others, and if so, who? Yes  Plans to Return to Present Housing: Yes  Other Identified Issues/Barriers to RETURNING to current housing: general surgery following   Potential Assistance needed at discharge: N/A            Potential DME:    Patient expects to discharge to:

## 2024-09-05 NOTE — PLAN OF CARE
Patient is here for diverticulitis. On IV antibiotics  Surgery on consult- medical management, no plan for surgery, clear liquid diet  C-diff r/o- sample sent today  Vitals stable- denies abdominal pain   Pt is independent- fall precautions in place      Problem: Discharge Planning  Goal: Discharge to home or other facility with appropriate resources  Outcome: Progressing     Problem: Pain  Goal: Verbalizes/displays adequate comfort level or baseline comfort level  Outcome: Progressing   Patient having pain on their head and rates it a 5. Pain interventions includemedication. Patients goal for pain relief is 1. The need for pain and symptom management will be considered in the discharge planning process to ensure patients comfort.   Problem: Gastrointestinal - Adult  Goal: Maintains or returns to baseline bowel function  Outcome: Progressing     Problem: Gastrointestinal - Adult  Goal: Maintains adequate nutritional intake  Outcome: Progressing

## 2024-09-05 NOTE — PROGRESS NOTES
Patient had relatively uneventful evening.  Patient had two small, brown, mucousy stools.  Minor headache & abdominal discomfort, but denies need for pain medication. Received IV Flagyl.  Alert & oriented x4.  Steady gait.  Side rails x2 raised for patient safety.  Will monitor.

## 2024-09-05 NOTE — PROGRESS NOTES
Spiritual Health Assessment/Progress Note  [facility]    (P) Spiritual/Emotional Needs, Initial Encounter,  ,  ,      Name: Cecil Bauer MRN: [id number]    Age: 44 y.o.     Sex: male   Language: English   Mosque: Jehovah's witness   Diverticulitis of colon with perforation     Date: 9/5/2024            Total Time Calculated: (P) 19 min              Spiritual Assessment began in [facility]        Referral/Consult From: (P) Rounding   Encounter Overview/Reason: (P) Spiritual/Emotional Needs, Initial Encounter  Service Provided For: (P) Patient and family together    Itzel, Belief, Meaning:   Patient identifies as spiritual and has beliefs or practices that help with coping during difficult times  Family/Friends identify as spiritual and have beliefs or practices that help with coping during difficult times      Importance and Influence:  Patient has spiritual/personal beliefs that influence decisions regarding their health  Family/Friends have spiritual/personal beliefs that influence decisions regarding the patient's health    Community:  Patient feels well-supported. Support system includes: Spouse/Partner  Family/Friends feel well-supported. Support system includes: Spouse/Partner    Assessment and Plan of Care:     Patient Interventions include: Facilitated expression of thoughts and feelings and Affirmed coping skills/support systems  Family/Friends Interventions include: Affirmed coping skills/support systems    Patient Plan of Care: Spiritual Care available upon further referral  Family/Friends Plan of Care: Spiritual Care available upon further referral    Electronically signed by Chaplain Michael on 9/5/2024 at 4:37 PM

## 2024-09-05 NOTE — PROGRESS NOTES
Transitions of Care Pharmacy Service   Medication Review    The patient's list of current home medications has been reviewed.     Source(s) of information: patient    Based on information provided by the above source(s), I have updated the patient's home med list as described below.     Please review the ACTION REQUESTED section of this note for any discrepancies on current hospital orders.      I changed or updated the following medications on the patient's home medication list:  Discontinued None     Added Advil OTC     Adjusted   none     Other Notes none          PROVIDER ACTION REQUESTED  Medications that need to be addressed by a physician/nurse practitioner:    Medication Action Requested        None         Please feel free to call me with any questions about this encounter. Thank you.    This note will be reviewed and co-signed by the Transitions of [facility] Pharmacist.    SORAYA CONTRERAS, PharmD student  [facility] Pharmacy Service  Phone:  [phone]  Fax: [phone]      Electronically signed by SORAYA CONTRERAS on 9/5/2024 at 11:30 AM         Prior to Admission medications    Medication Sig Start Date End Date Taking? Authorizing Provider   ibuprofen (ADVIL;MOTRIN) 200 MG tablet Take 1 tablet by mouth every 6 hours as needed (Headache)   Yes Provider, MD Praneeth

## 2024-09-05 NOTE — PLAN OF CARE
Problem: Discharge Planning  Goal: Discharge to home or other facility with appropriate resources  Outcome: Progressing  Note: Discharge teaching and instructions for diagnosis/procedure explained with patient using teachback method.  Patient voiced understanding regarding prescriptions, follow up appointments, and care of self at home.      Problem: ABCDS Injury Assessment  Goal: Absence of physical injury  Outcome: Progressing  Note: Non-skid socks in place, up with assistance, bed in lowest position, bed exit & alarm as needed, provide toileting every 2 hours an d as needed.     Problem: Pain  Goal: Verbalizes/displays adequate comfort level or baseline comfort level  Outcome: Progressing  Note: Patient denies having pain on their abdomen and rates it a  0 . Pain interventions includerelaxation techniques. Patients goal for pain relief is  0 . The need for pain and symptom management will be considered in the discharge planning process to ensure patients comfort.

## 2024-09-06 VITALS
RESPIRATION RATE: 16 BRPM | DIASTOLIC BLOOD PRESSURE: 85 MMHG | WEIGHT: 199.9 LBS | TEMPERATURE: 98.6 F | OXYGEN SATURATION: 98 % | SYSTOLIC BLOOD PRESSURE: 126 MMHG | BODY MASS INDEX: 28.62 KG/M2 | HEIGHT: 70 IN | HEART RATE: 69 BPM

## 2024-09-06 LAB
ANION GAP SERPL CALCULATED.3IONS-SCNC: 9 MMOL/L (ref 9–17)
BASOPHILS # BLD: 0.04 K/UL (ref 0–0.2)
BASOPHILS NFR BLD: 1 % (ref 0–2)
BUN SERPL-MCNC: 8 MG/DL (ref 6–20)
BUN/CREAT SERPL: 10 (ref 9–20)
CALCIUM SERPL-MCNC: 8.9 MG/DL (ref 8.6–10.4)
CHLORIDE SERPL-SCNC: 106 MMOL/L (ref 98–107)
CO2 SERPL-SCNC: 25 MMOL/L (ref 20–31)
CREAT SERPL-MCNC: 0.8 MG/DL (ref 0.7–1.2)
EOSINOPHIL # BLD: 0.16 K/UL (ref 0–0.44)
EOSINOPHILS RELATIVE PERCENT: 2 % (ref 1–4)
ERYTHROCYTE [DISTWIDTH] IN BLOOD BY AUTOMATED COUNT: 12.2 % (ref 11.8–14.4)
GFR, ESTIMATED: >90 ML/MIN/1.73M2
GLUCOSE SERPL-MCNC: 101 MG/DL (ref 70–99)
HCT VFR BLD AUTO: 45.7 % (ref 40.7–50.3)
HGB BLD-MCNC: 15.4 G/DL (ref 13–17)
IMM GRANULOCYTES # BLD AUTO: 0.02 K/UL (ref 0–0.3)
IMM GRANULOCYTES NFR BLD: 0 %
LYMPHOCYTES NFR BLD: 1.1 K/UL (ref 1.1–3.7)
LYMPHOCYTES RELATIVE PERCENT: 16 % (ref 24–43)
MAGNESIUM SERPL-MCNC: 2.1 MG/DL (ref 1.6–2.6)
MCH RBC QN AUTO: 30.4 PG (ref 25.2–33.5)
MCHC RBC AUTO-ENTMCNC: 33.7 G/DL (ref 28.4–34.8)
MCV RBC AUTO: 90.1 FL (ref 82.6–102.9)
MONOCYTES NFR BLD: 0.56 K/UL (ref 0.1–1.2)
MONOCYTES NFR BLD: 8 % (ref 3–12)
NEUTROPHILS NFR BLD: 72 % (ref 36–65)
NEUTS SEG NFR BLD: 4.92 K/UL (ref 1.5–8.1)
NRBC BLD-RTO: 0 PER 100 WBC
PLATELET # BLD AUTO: 222 K/UL (ref 138–453)
PMV BLD AUTO: 9.9 FL (ref 8.1–13.5)
POTASSIUM SERPL-SCNC: 4.7 MMOL/L (ref 3.7–5.3)
RBC # BLD AUTO: 5.07 M/UL (ref 4.21–5.77)
SODIUM SERPL-SCNC: 140 MMOL/L (ref 135–144)
WBC OTHER # BLD: 6.8 K/UL (ref 3.5–11.3)

## 2024-09-06 PROCEDURE — 83735 ASSAY OF MAGNESIUM: CPT

## 2024-09-06 PROCEDURE — 36415 COLL VENOUS BLD VENIPUNCTURE: CPT

## 2024-09-06 PROCEDURE — 6360000002 HC RX W HCPCS: Performed by: NURSE PRACTITIONER

## 2024-09-06 PROCEDURE — 80048 BASIC METABOLIC PNL TOTAL CA: CPT

## 2024-09-06 PROCEDURE — 85025 COMPLETE CBC W/AUTO DIFF WBC: CPT

## 2024-09-06 PROCEDURE — 99238 HOSP IP/OBS DSCHRG MGMT 30/<: CPT | Performed by: INTERNAL MEDICINE

## 2024-09-06 RX ADMIN — METRONIDAZOLE 500 MG: 500 INJECTION, SOLUTION INTRAVENOUS at 06:05

## 2024-09-06 NOTE — DISCHARGE SUMMARY
[facility]  Office: [phone]  John Dunn DO, Tomasz Huynh DO, Eddy Chan DO, Shashi Adams DO, Mervat Stockton MD, Kenya Mitchell MD, Evita Feliz MD, Alba Min MD,  Al Becerra MD, Dc Otero MD, John Paul Villalpando MD,  Priya Jay DO, Victoria Vegas MD, Tha Alexander MD, Jason Dunn DO, Stephanie Rogers MD,  Jose Galindo DO, Shoshana Sauer MD, Hazel Guardado MD, Leticia Vogt MD, Melina Michaud MD,  Darrin Lockwood MD, Sahara Nunes MD, Lilli Hernandez MD, Gypsy Reeves MD, Siddhartha Heart MD, Tiffany Jaime MD, Jayden Griffin DO, Nathan Avila DO, Primo Sorenson DO, Lazara Hernandez MD,  Lauro Haynes MD, Shirley Waterhouse, CNP,  Kathy Haines CNP, Jayden Merino, CNP,  Annel Luna, PATTI, Alexandria Waldron, CNP, Luci Mullen, CNP, Shari Ferrer, CNP, Darlene Arnold, CNP, Sherrie Cox, PA-C, Candie Santillan, PA-C, Divya Bella, CNP, Erlin Roy, CNP,  Beba Reza, CNP, Ena Weldon, CNP, Mary Roth, CNP, Bhavna Preston, CNS, Dayanna Olson, CNP, Lilia Blanco CNP, Tracy Schwab, CNP         [facility]   IN-PATIENT SERVICE   [facility]    Discharge Summary     Patient ID: Cecil Bauer  :  1980   MRN: [id number]     ACCOUNT:  [id number]   Patient's PCP: No primary care provider on file.  Admit Date: 2024   Discharge Date: 2024     Length of Stay: 2  Code Status:  Full Code  Admitting Physician: Shashi Adams DO  Discharge Physician: Shashi P Blood, DO     Active Discharge Diagnoses:     Hospital Problem Lists:  Principal Problem:    Diverticulitis of colon with perforation  Resolved Problems:    * No resolved hospital problems. *      Admission Condition:  fair     Discharged Condition: stable    Hospital Stay:     Hospital Course:  Cecil Bauer is a 44 y.o. male who was admitted for the management of  Diverticulitis of colon with perforation , presented to ER with Nausea, Diarrhea (X 2 days. Pt

## 2024-09-06 NOTE — PROGRESS NOTES
[facility]  Office: [phone]  John Dunn DO, Tomasz Huynh, DO, Eddy Chan DO, Shashi Adams, DO, Mervat Stockton MD, Kenya Mitchell MD, Evita Feliz MD, Alba Min MD,  Al Becerra MD, Dc Otero MD, John Paul Villalpando MD,  Priya Jay DO, Victoria Vegas MD, Tha Alexander MD, Jason Dunn DO, Stephanie Rogers MD,  Jose Galindo DO, Shoshana Sauer MD, Hazel Guardado MD, Leticia Vogt MD, Melina Michaud MD,  Darrin Lockwood MD, Sahara Nunes MD, Lilli Hernandez MD, Gypsy Reeves MD, Siddhartha Heart MD, Tiffany Jaime MD, Jayden Griffin, DO, Nathan Avila DO, Primo Sorenson DO, Lazara Hernandez MD,  Lauro Haynes MD, Shirley Waterhouse, CNP,  Kathy Hianes, CNP, Jayden Merino, CNP,  Annel Luna, DNP, Alexandria Waldron, CNP, Luci Mullen, CNP, Shari Ferrer, CNP, Darlene Arnold, CNP, Sherrie Cox, PA-C, Candie Santillan, PA-C, Divya Bella, CNP, Erlin Roy, CNP,  Beba Reza, CNP, Ena Weldon, CNP, Mary Roth, CNP, Bhavna Preston, CNS, Dayanna Olson, CNP, Lilia Blanco CNP, Tracy Schwab, CNP         [facility]   IN-PATIENT SERVICE   [facility]    Progress Note    9/6/2024    12:33 PM    Name:   Cecil Bauer  MRN:     [id number]     Acct:      [id number]   Room:   [facility] Day:  2  Admit Date:  9/4/2024 12:10 PM    PCP:   No primary care provider on file.  Code Status:  Full Code    Subjective:     C/C:   Chief Complaint   Patient presents with    Nausea    Diarrhea     X 2 days. Pt states they had ecoli in water    Abdominal Pain     Hernia on the right side      Interval History Status: improved.     Feels better today  Denies cp/sob/n/v  Abd pain better    Brief History:     This 44 yom presents with abd pain that started day before admission. It has been constant, located lower center abdomen and felt like gas pains. He has also had nausea, some diarrhea and low grade fevers. It also hurt to urinate. He states for a  long time he has had variable stool consistency.     Review of Systems:     Constitutional:  negative for chills, fevers, sweats  Respiratory:  negative for cough, dyspnea on exertion, shortness of breath, wheezing  Cardiovascular:  negative for chest pain, chest pressure/discomfort, lower extremity edema, palpitations  Gastrointestinal:  negative for constipation, diarrhea, nausea, vomiting  Neurological:  negative for dizziness, headache    Medications:     Allergies:  No Known Allergies    Current Meds:   Scheduled Meds:    acetaminophen  650 mg Oral Once    sodium chloride flush  5-40 mL IntraVENous 2 times per day    metroNIDAZOLE  500 mg IntraVENous Q8H    cefTRIAXone (ROCEPHIN) IV  1,000 mg IntraVENous Q24H     Continuous Infusions:    sodium chloride      sodium chloride 50 mL/hr at 24 0604     PRN Meds: sodium chloride flush, sodium chloride flush, sodium chloride, potassium chloride **OR** potassium alternative oral replacement **OR** potassium chloride, magnesium sulfate, ondansetron **OR** ondansetron, acetaminophen **OR** acetaminophen, polyethylene glycol    Data:     Past Medical History:   has no past medical history on file.    Social History:   reports that he has never smoked. He has never used smokeless tobacco. He reports current alcohol use. He reports that he does not use drugs.     Family History:   Family History   Problem Relation Age of Onset    Prostate Cancer Maternal Grandfather     Colon Cancer Paternal Grandfather        Vitals:  /85   Pulse 69   Temp 98.6 °F (37 °C) (Oral)   Resp 16   Ht 1.778 m (5' 10\")   Wt 90.7 kg (199 lb 14.4 oz)   SpO2 98%   BMI 28.68 kg/m²   Temp (24hrs), Av.9 °F (36.6 °C), Min:97.3 °F (36.3 °C), Max:98.6 °F (37 °C)    No results for input(s): \"POCGLU\" in the last 72 hours.    I/O (24Hr):    Intake/Output Summary (Last 24 hours) at 2024 1233  Last data filed at 2024 0604  Gross per 24 hour   Intake 1154.02 ml   Output --   Net

## 2024-09-06 NOTE — PROGRESS NOTES
General Surgery:  Daily Progress Note          Dr. Rios, Dr. Hay, Dr. Akhtar          PATIENT NAME: Cecil Bauer     TODAY'S DATE: 9/6/2024, 6:16 AM  CC:  Abdominal Pain    SUBJECTIVE:     Pt seen and examined at bedside. No acute overnight events. Abdominal pain continues to remain improved. Reports still having some loose stools.    OBJECTIVE:   VITALS:  /73   Pulse 57   Temp 97.7 °F (36.5 °C) (Oral)   Resp 16   Ht 1.778 m (5' 10\")   Wt 90.7 kg (199 lb 14.4 oz)   SpO2 99%   BMI 28.68 kg/m²      INTAKE/OUTPUT:      Intake/Output Summary (Last 24 hours) at 9/6/2024 0616  Last data filed at 9/6/2024 0604  Gross per 24 hour   Intake 1154.02 ml   Output --   Net 1154.02 ml       PHYSICAL EXAM:  General Appearance:  awake, alert, oriented, in no acute distress  HEENT:  Normocephalic, atraumatic, mucus membranes moist   Lungs:  Normal effort with respirations  Heart:  Regular rate and rhythm  Abdomen:  soft, ND, nontender  Extremities: No cyanosis, pitting edema, rashes noted  Skin: skin color, texture, turgor normal, no rashes or lesions      IV Access:  PIV      ASSESSMENT:  Active Hospital Problems    Diagnosis Date Noted    Diverticulitis of colon with perforation [K57.20] 09/04/2024       44 y.o. male with Microperforation diverticulitis    Plan:  Diet: Advance to full liquid diet this morning okay for low fiber diet this afternoon if he tolerates   Encourage ambulation  No acute surgical interventions at this time continue to treat with abx. Okay to transition to oral abx if WBC count improves today. He will need Augmentin 875-125 BID for 7 days on discharge.  Morning labs pending  Plan for outpatient follow up in two weeks with Dr. Rios after discharge        Electronically signed by Lasha Kunz MD  on 9/6/2024 at 6:16 AM        I reviewed the patient's medical history, the resident's findings on physical examination, the patient's diagnoses, and treatment plan as documented in the

## 2024-09-06 NOTE — PLAN OF CARE
Pt has been resting comfortably in his room for most of the day. Diet advanced to a full liquid diet and tolerated it well. Anticipate diet advancing to a regular diet. NS going at 50 mls/hr. Pt remains A&O x4 and independent. He denies pain/ discomfort. All questions and concerns have been addressed. Bed is locked and in the lowest position with the call light in reach. Safety maintained.     Problem: Discharge Planning  Goal: Discharge to home or other facility with appropriate resources  9/6/2024 1152 by Nery Deutsch, RN  Outcome: Progressing

## 2024-09-06 NOTE — PLAN OF CARE
Problem: Discharge Planning  Goal: Discharge to home or other facility with appropriate resources  9/5/2024 2300 by Michael Rodrigez RN  Outcome: Progressing  Note: Discharge teaching and instructions for diagnosis/procedure explained with patient using teachback method. Patient voiced understanding regarding prescriptions, follow up appointments, and care of self at home.   9/5/2024 1639 by Lisa Harris RN  Outcome: Progressing     Problem: ABCDS Injury Assessment  Goal: Absence of physical injury  Outcome: Progressing  Note: Non-skid socks in place, up with assistance, bed in lowest position, bed exit & alarm as needed, provide toileting every 2 hours an d as needed.     Problem: Pain  Goal: Verbalizes/displays adequate comfort level or baseline comfort level  9/5/2024 2300 by Michael Rodrigez RN  Outcome: Progressing  Note: Patient denies having pain and rates it a  0 . Pain interventions includefrequent position changes. Patients goal for pain relief is  0 . The need for pain and symptom management will be considered in the discharge planning process to ensure patients comfort.   9/5/2024 1639 by Lisa Harris RN  Outcome: Progressing     Problem: Gastrointestinal - Adult  Goal: Maintains or returns to baseline bowel function  9/5/2024 2300 by Michael Rodrigez RN  Outcome: Progressing  Note: Assessed bowel function.  Assessed for active bowel sounds and/or flatus.  Assessed for number, form, & frequency of stools.  9/5/2024 1640 by Lisa Harris RN  Outcome: Progressing  Goal: Maintains adequate nutritional intake  9/5/2024 2300 by Michael Rodrigez RN  Outcome: Progressing  Note: Review diet daily and as needed with pt.  Provide supplement nutrition as ordered by physician & educate pt.  Review nutritional guidelines with pt.  9/5/2024 1640 by Lisa Harris RN  Outcome: Progressing

## 2024-09-06 NOTE — PROGRESS NOTES
Pt discharged to discharge trelle, wife will be picking him up soon. Belonging gathered and taken with pt, IV removed, discharge instruction given, pt verbalizes understanding. All questions and concerns addressed. Safety maintained.